# Patient Record
Sex: MALE | Race: WHITE | NOT HISPANIC OR LATINO | Employment: UNEMPLOYED | URBAN - NONMETROPOLITAN AREA
[De-identification: names, ages, dates, MRNs, and addresses within clinical notes are randomized per-mention and may not be internally consistent; named-entity substitution may affect disease eponyms.]

---

## 2024-01-14 ENCOUNTER — HOSPITAL ENCOUNTER (EMERGENCY)
Facility: HOSPITAL | Age: 39
Discharge: HOME | End: 2024-01-14
Payer: MEDICAID

## 2024-01-14 VITALS
OXYGEN SATURATION: 99 % | HEART RATE: 64 BPM | RESPIRATION RATE: 16 BRPM | SYSTOLIC BLOOD PRESSURE: 135 MMHG | WEIGHT: 135 LBS | TEMPERATURE: 97.7 F | DIASTOLIC BLOOD PRESSURE: 85 MMHG | HEIGHT: 70 IN | BODY MASS INDEX: 19.33 KG/M2

## 2024-01-14 DIAGNOSIS — R60.0 FACIAL EDEMA: Primary | ICD-10-CM

## 2024-01-14 PROCEDURE — 99283 EMERGENCY DEPT VISIT LOW MDM: CPT | Performed by: NURSE PRACTITIONER

## 2024-01-14 PROCEDURE — 99283 EMERGENCY DEPT VISIT LOW MDM: CPT

## 2024-01-14 RX ORDER — CLINDAMYCIN HYDROCHLORIDE 300 MG/1
300 CAPSULE ORAL 4 TIMES DAILY
Qty: 28 CAPSULE | Refills: 0 | Status: SHIPPED | OUTPATIENT
Start: 2024-01-14 | End: 2024-01-21

## 2024-01-14 ASSESSMENT — COLUMBIA-SUICIDE SEVERITY RATING SCALE - C-SSRS
1. IN THE PAST MONTH, HAVE YOU WISHED YOU WERE DEAD OR WISHED YOU COULD GO TO SLEEP AND NOT WAKE UP?: NO
2. HAVE YOU ACTUALLY HAD ANY THOUGHTS OF KILLING YOURSELF?: NO
6. HAVE YOU EVER DONE ANYTHING, STARTED TO DO ANYTHING, OR PREPARED TO DO ANYTHING TO END YOUR LIFE?: NO

## 2024-01-14 ASSESSMENT — PAIN SCALES - GENERAL: PAINLEVEL_OUTOF10: 0 - NO PAIN

## 2024-01-14 ASSESSMENT — PAIN - FUNCTIONAL ASSESSMENT: PAIN_FUNCTIONAL_ASSESSMENT: 0-10

## 2024-01-14 NOTE — Clinical Note
Chriss Ivey was seen and treated in our emergency department on 1/14/2024.  He may return to work on 01/16/2024.       If you have any questions or concerns, please don't hesitate to call.      Nydia Gabriel, APRN-CNP

## 2024-01-14 NOTE — ED PROVIDER NOTES
Chief Complaint   Patient presents with    Facial Swelling     Amb to ED with L side facial swelling s/p dental extraction 1/12/24. Denies any pain and is taking Amoxil already. Has Motrin and Norco RX for pain if needed.         Patient History    No past medical history on file.   Past Surgical History:   Procedure Laterality Date    OTHER SURGICAL HISTORY  06/10/2021    No history of surgery      No family history on file.   Social History     Social History Narrative    Not on file      No Known Allergies     PMH: Reviewed  PSH: Reviewed  Social History: Reviewed.   Allergies reviewed.     HPI: Chriss Ivey is a 39 y.o. male who presents to the ED today unaccompanied with complaints of facial swelling.  States he had all of his teeth extracted 2 days ago.  He was put on amoxicillin for antibiotic coverage and also prescribed Motrin and Norco for pain.  States that the swelling has been increasing on the left side of his face and nearing his eye which concerned him.  No fevers.  No vomiting.    PHYSICAL EXAM:    GENERAL: Vitals noted, no distress. Alert and oriented x 3. Non-toxic.       HEAD: Normocephalic, atraumatic. Pupils equally round and reactive to light. EOMI. facial edema noted in the left side more so than the right.  Gumlines are without abscess or ecchymosis.  Blood clots noted in the dental extraction cavities.  No trismus noted.  No skin abscess palpated.    NECK: Supple. No midline or paraspinal tenderness through full range of motion.      CARDIAC: Regular rate, rhythm. No murmurs or rubs.    RESPIRATORY: Lungs clear and equal bilaterally. No respiratory distress.     MUSCULOSKELETAL & SKIN:  Warm, dry, and intact. No rash/lesions. No peripheral edema.     NEURO: No focal neurologic deficits, acting appropriately.     Labs Reviewed - No data to display     No orders to display        Medical Decision Making         ED COURSE: This patient was seen and examined by myself independently.  Will  increase his antibiotics from Amoxil to clindamycin.  Sent to [facility] pharmacy by his request.  Continue Motrin and Norco at home.  Follow-up with dentist as needed.  Given work note for 2 days by request. He is discharged home in a stable condition with computer instructions given and is encouraged to return to the ER for any new or worsening symptoms.        DIAGNOSTIC IMPRESSION: #1 facial edema status post dental extraction     Nydia Gabriel, LUZ MARINA-CNP  01/14/24 0122

## 2025-02-18 ENCOUNTER — HOSPITAL ENCOUNTER (EMERGENCY)
Facility: HOSPITAL | Age: 40
Discharge: HOME | End: 2025-02-18
Payer: MEDICAID

## 2025-02-18 ENCOUNTER — APPOINTMENT (OUTPATIENT)
Dept: RADIOLOGY | Facility: HOSPITAL | Age: 40
End: 2025-02-18
Payer: MEDICAID

## 2025-02-18 VITALS
SYSTOLIC BLOOD PRESSURE: 132 MMHG | BODY MASS INDEX: 21.48 KG/M2 | RESPIRATION RATE: 16 BRPM | OXYGEN SATURATION: 98 % | HEIGHT: 69 IN | DIASTOLIC BLOOD PRESSURE: 83 MMHG | TEMPERATURE: 99.8 F | WEIGHT: 145 LBS | HEART RATE: 86 BPM

## 2025-02-18 DIAGNOSIS — J10.1 INFLUENZA A: Primary | ICD-10-CM

## 2025-02-18 LAB
FLUAV RNA RESP QL NAA+PROBE: DETECTED
FLUBV RNA RESP QL NAA+PROBE: NOT DETECTED
SARS-COV-2 RNA RESP QL NAA+PROBE: NOT DETECTED

## 2025-02-18 PROCEDURE — 87636 SARSCOV2 & INF A&B AMP PRB: CPT | Performed by: NURSE PRACTITIONER

## 2025-02-18 PROCEDURE — 99284 EMERGENCY DEPT VISIT MOD MDM: CPT | Mod: 25

## 2025-02-18 PROCEDURE — 71045 X-RAY EXAM CHEST 1 VIEW: CPT | Performed by: RADIOLOGY

## 2025-02-18 PROCEDURE — 71045 X-RAY EXAM CHEST 1 VIEW: CPT

## 2025-02-18 ASSESSMENT — PAIN DESCRIPTION - LOCATION: LOCATION: HEAD

## 2025-02-18 ASSESSMENT — PAIN SCALES - GENERAL: PAINLEVEL_OUTOF10: 4

## 2025-02-18 ASSESSMENT — PAIN - FUNCTIONAL ASSESSMENT: PAIN_FUNCTIONAL_ASSESSMENT: 0-10

## 2025-02-18 ASSESSMENT — PAIN DESCRIPTION - PAIN TYPE: TYPE: ACUTE PAIN

## 2025-02-18 NOTE — Clinical Note
Chriss Ivey was seen and treated in our emergency department on 2/18/2025.  He may return to work on 02/21/2025.       If you have any questions or concerns, please don't hesitate to call.      Nydia Gabriel, APRN-CNP

## 2025-02-19 NOTE — ED PROVIDER NOTES
Chief Complaint   Patient presents with    Fever     Reports fever of 101.8 today. Endorses cough x2-3days, chills, HA and NV. Taking OTC congestion medication        Patient History    No past medical history on file.   Past Surgical History:   Procedure Laterality Date    OTHER SURGICAL HISTORY  06/10/2021    No history of surgery      No family history on file.   Social History     Social History Narrative    Not on file      No Known Allergies     PMH: Reviewed  PSH: Reviewed  Social History: Reviewed.   Allergies reviewed.     HPI: Chriss Ivey is a 40 y.o. male who presents to the ED today accompanied by significant other with complaints of fever and cough.  States the cough started 2 to 3 days ago.  Also developed chills, headache, nausea and vomiting today.  States his temp was 101.8 this evening.  He states he has not taken any over-the-counter medications since his temperature was elevated tonight.  He had taken some at work earlier today prior to vomiting once.  Denies known ill contacts.  Non-smoker.    PHYSICAL EXAM:    GENERAL: Vitals noted, no distress. Alert and oriented x 3. Non-toxic.       HEAD: Normocephalic, atraumatic. Pupils equally round and reactive to light. EOMI.     NECK: Supple. No midline or paraspinal tenderness through full range of motion.      CARDIAC: Regular rate, rhythm. No murmurs or rubs.    RESPIRATORY: Lungs clear and equal bilaterally. No respiratory distress.     MUSCULOSKELETAL & SKIN:  Warm, dry, and intact. No rash/lesions. No peripheral edema.     NEURO: No focal neurologic deficits, acting appropriately.     Labs Reviewed   SARS-COV-2 AND INFLUENZA A/B PCR - Abnormal       Result Value    Flu A Result Detected (*)     Flu B Result Not Detected      Coronavirus 2019, PCR Not Detected      Narrative:     This assay is an FDA-cleared, in vitro diagnostic nucleic acid amplification test for the qualitative detection and differentiation of SARS CoV-2/ Influenza A/B from  nasopharyngeal specimens collected from individuals with signs and symptoms of respiratory tract infections, and has been validated for use at [facility]. Negative results do not preclude COVID-19/ Influenza A/B infections and should not be used as the sole basis for diagnosis, treatment, or other management decisions. Testing for SARS CoV-2 is recommended only for patients who meet current clinical and/or epidemiological criteria defined by federal, state, or local public health directives.        XR chest 1 view   Final Result   No acute cardiopulmonary process.        MACRO:   None        Signed by: Franklin Jaime 2/18/2025 10:05 PM   Dictation workstation:   [id number]           Medical Decision Making         ED COURSE: This patient was seen and examined by myself independently.  Influenza A positive, influenza B and COVID-negative.  Chest x-ray negative for pneumonia.  Discussed Tamiflu, patient does not want to use it.  Recommended Motrin, Tylenol, fluids, rest.  Given work note.  Discharged home in stable condition with computer instructions given and referred to PCP for follow-up care.      DIAGNOSTIC IMPRESSION: #1 influenza A     Nydia Gabriel, LUZ MARINA-CNP  02/18/25 [id number]